# Patient Record
Sex: FEMALE | Race: WHITE | NOT HISPANIC OR LATINO | Employment: OTHER | URBAN - METROPOLITAN AREA
[De-identification: names, ages, dates, MRNs, and addresses within clinical notes are randomized per-mention and may not be internally consistent; named-entity substitution may affect disease eponyms.]

---

## 2017-01-26 ENCOUNTER — OFFICE VISIT (OUTPATIENT)
Dept: INTERNAL MEDICINE | Facility: CLINIC | Age: 47
End: 2017-01-26
Attending: INTERNAL MEDICINE
Payer: COMMERCIAL

## 2017-01-26 VITALS
BODY MASS INDEX: 26.68 KG/M2 | SYSTOLIC BLOOD PRESSURE: 110 MMHG | DIASTOLIC BLOOD PRESSURE: 70 MMHG | HEIGHT: 62 IN | WEIGHT: 145 LBS | OXYGEN SATURATION: 98 % | HEART RATE: 67 BPM

## 2017-01-26 DIAGNOSIS — Z00.00 ROUTINE HISTORY AND PHYSICAL EXAMINATION OF ADULT: Primary | ICD-10-CM

## 2017-01-26 PROCEDURE — 99396 PREV VISIT EST AGE 40-64: CPT | Mod: S$GLB,,, | Performed by: INTERNAL MEDICINE

## 2017-01-26 RX ORDER — BUPROPION HYDROCHLORIDE 150 MG/1
150 TABLET ORAL DAILY
Qty: 60 TABLET | Refills: 5
Start: 2017-01-26 | End: 2017-07-06 | Stop reason: SDUPTHER

## 2017-01-26 NOTE — MR AVS SNAPSHOT
"[facility]  [address] [place] [address], [facility] [address]  [facility] [address]  Phone: [phone]  Fax: [phone]                  Mary Schneider   2017 9:30 AM   Office Visit    Description:  Female : 1970   Provider:  MAYRA Blanchard MD   Department:  [facility]           Reason for Visit     Follow-up           Diagnoses this Visit        Comments    Routine history and physical examination of adult    -  Primary            To Do List           Future Appointments        Provider Department Dept Phone    2017 9:00 AM MD [facility] [phone]      Goals (5 Years of Data)     None      Follow-Up and Disposition     Return in about 6 months (around 2017).      [facility]     [facility] -  Assistance  Registered nurses in the [facility] provide clinical advisement, health education, appointment booking, and other advisory services.  Call for this free service at [phone].             Medications           Message regarding Medications     Verify the changes and/or additions to your medication regime listed below are the same as discussed with your clinician today.  If any of these changes or additions are incorrect, please notify your healthcare provider.             Verify that the below list of medications is an accurate representation of the medications you are currently taking.  If none reported, the list may be blank. If incorrect, please contact your healthcare provider. Carry this list with you in case of emergency.           Current Medications     buPROPion (WELLBUTRIN XL) 150 MG TB24 tablet 1 tab each morning for 1 week, then 2 tabs each morning.    MINASTRIN 24 FE 1 mg-20 mcg(24) /75 mg (4) Chew Take 1 tablet by mouth once daily.           Clinical Reference Information           Vital Signs - Last Recorded  Most recent update: 2017  9:51 AM by Lokesh Lion MA    BP Pulse Ht Wt SpO2 BMI    110/70 67 5' 2" (1.575 m) 65.8 kg " (145 lb) 98% 26.52 kg/m2      Blood Pressure          Most Recent Value    BP  110/70      Allergies as of 1/26/2017     Pcn [Penicillins]    Erythromycin      Immunizations Administered on Date of Encounter - 1/26/2017     None      Orders Placed During Today's Visit     Future Labs/Procedures Expected by Expires    C-reactive protein  1/26/2017 (Approximate) 3/27/2018    CBC auto differential  1/26/2017 (Approximate) 3/27/2018    Comprehensive metabolic panel  1/26/2017 (Approximate) 3/27/2018    Hemoglobin A1c  1/26/2017 (Approximate) 3/27/2018    Lipid panel  1/26/2017 (Approximate) 3/27/2018    TSH  1/26/2017 (Approximate) 3/27/2018    Vitamin B12  1/26/2017 (Approximate) 3/27/2018    Vitamin D  1/26/2017 1/26/2018      Instructions    For weight loss:  Consider The Ideal Protein diet.  Consider taking a Probiotic to maintain a healthy intestinal biome.  Eat more Artichoke, Asperigus, Onions, and Garlic.

## 2017-01-26 NOTE — PROGRESS NOTES
Subjective:       Patient ID: Mary Schneider is a 46 y.o. female.    Chief Complaint: Follow-up    HPI Comments: Lost 5 lb in 3 mo on Wellbutrin 150 mg. Feels good.    Review of Systems   Constitutional: Negative.    Respiratory: Negative for shortness of breath.    Cardiovascular: Negative for chest pain.       Objective:      Physical Exam   Constitutional: She is oriented to person, place, and time. She appears well-developed and well-nourished.   HENT:   Head: Normocephalic and atraumatic.   Right Ear: External ear normal.   Left Ear: External ear normal.   Nose: Nose normal.   Mouth/Throat: Oropharynx is clear and moist. No oropharyngeal exudate.   Eyes: EOM are normal. Pupils are equal, round, and reactive to light. Right eye exhibits no discharge. Left eye exhibits no discharge.   Neck: Normal range of motion. Neck supple. No JVD present. No thyromegaly present.   Cardiovascular: Normal rate, regular rhythm and intact distal pulses.  Exam reveals no gallop and no friction rub.    No murmur heard.  Pulmonary/Chest: Effort normal and breath sounds normal. No respiratory distress. She has no rales. She exhibits no tenderness.   Abdominal: Soft. Bowel sounds are normal. There is no tenderness. There is no rebound.   Musculoskeletal: Normal range of motion. She exhibits no edema.   Neurological: She is alert and oriented to person, place, and time.   Skin: Skin is warm. She is not diaphoretic.   Psychiatric: She has a normal mood and affect.       Assessment:       1. Routine history and physical examination of adult        Plan:       Per orders and D/C instructions.  [facility]

## 2017-01-26 NOTE — PATIENT INSTRUCTIONS
For weight loss:  Consider The Ideal Protein diet.  Consider taking a Probiotic to maintain a healthy intestinal biome.  Eat more Artichoke, Asperigus, Onions, and Garlic.

## 2017-07-06 ENCOUNTER — OFFICE VISIT (OUTPATIENT)
Dept: INTERNAL MEDICINE | Facility: CLINIC | Age: 47
End: 2017-07-06
Attending: INTERNAL MEDICINE
Payer: COMMERCIAL

## 2017-07-06 VITALS
HEART RATE: 75 BPM | WEIGHT: 142 LBS | HEIGHT: 63 IN | DIASTOLIC BLOOD PRESSURE: 82 MMHG | BODY MASS INDEX: 25.16 KG/M2 | OXYGEN SATURATION: 99 % | SYSTOLIC BLOOD PRESSURE: 120 MMHG

## 2017-07-06 DIAGNOSIS — Z00.00 ROUTINE ADULT HEALTH MAINTENANCE: Primary | ICD-10-CM

## 2017-07-06 DIAGNOSIS — E78.00 HIGH CHOLESTEROL: ICD-10-CM

## 2017-07-06 PROCEDURE — 99396 PREV VISIT EST AGE 40-64: CPT | Mod: S$GLB,,, | Performed by: INTERNAL MEDICINE

## 2017-07-06 RX ORDER — BUPROPION HYDROCHLORIDE 150 MG/1
150 TABLET ORAL DAILY
Qty: 60 TABLET | Refills: 5
Start: 2017-07-06

## 2017-07-06 RX ORDER — LANOLIN ALCOHOL/MO/W.PET/CERES
1000 CREAM (GRAM) TOPICAL DAILY
COMMUNITY